# Patient Record
Sex: MALE | Race: WHITE | NOT HISPANIC OR LATINO | URBAN - METROPOLITAN AREA
[De-identification: names, ages, dates, MRNs, and addresses within clinical notes are randomized per-mention and may not be internally consistent; named-entity substitution may affect disease eponyms.]

---

## 2024-08-12 ENCOUNTER — OFFICE VISIT (OUTPATIENT)
Dept: URBAN - METROPOLITAN AREA CLINIC 50 | Facility: CLINIC | Age: 29
End: 2024-08-12
Payer: SELF-PAY

## 2024-08-12 ENCOUNTER — LAB OUTSIDE AN ENCOUNTER (OUTPATIENT)
Dept: URBAN - METROPOLITAN AREA CLINIC 50 | Facility: CLINIC | Age: 29
End: 2024-08-12

## 2024-08-12 ENCOUNTER — OFFICE VISIT (OUTPATIENT)
Dept: URBAN - METROPOLITAN AREA CLINIC 50 | Facility: CLINIC | Age: 29
End: 2024-08-12

## 2024-08-12 ENCOUNTER — DASHBOARD ENCOUNTERS (OUTPATIENT)
Age: 29
End: 2024-08-12

## 2024-08-12 ENCOUNTER — TELEPHONE ENCOUNTER (OUTPATIENT)
Dept: URBAN - METROPOLITAN AREA CLINIC 50 | Facility: CLINIC | Age: 29
End: 2024-08-12

## 2024-08-12 VITALS
BODY MASS INDEX: 23.25 KG/M2 | RESPIRATION RATE: 18 BRPM | HEART RATE: 66 BPM | TEMPERATURE: 98.1 F | HEIGHT: 70 IN | SYSTOLIC BLOOD PRESSURE: 110 MMHG | WEIGHT: 162.4 LBS | DIASTOLIC BLOOD PRESSURE: 80 MMHG

## 2024-08-12 DIAGNOSIS — K83.8 DILATED BILE DUCT: ICD-10-CM

## 2024-08-12 DIAGNOSIS — R10.13 ABDOMINAL DISCOMFORT, EPIGASTRIC: ICD-10-CM

## 2024-08-12 DIAGNOSIS — R74.8 ABNORMAL LIVER ENZYMES: ICD-10-CM

## 2024-08-12 DIAGNOSIS — R10.31 ABDOMINAL CRAMPING IN RIGHT LOWER QUADRANT: ICD-10-CM

## 2024-08-12 PROBLEM — 123608004: Status: ACTIVE | Noted: 2024-08-12

## 2024-08-12 PROBLEM — 162031009: Status: ACTIVE | Noted: 2024-08-12

## 2024-08-12 PROBLEM — 83132003: Status: ACTIVE | Noted: 2024-08-12

## 2024-08-12 PROBLEM — 274527008: Status: ACTIVE | Noted: 2024-08-12

## 2024-08-12 PROCEDURE — 99204 OFFICE O/P NEW MOD 45 MIN: CPT | Performed by: INTERNAL MEDICINE

## 2024-08-12 RX ORDER — HYOSCYAMINE SULFATE 0.12 MG/1
1 TABLET AS NEEDED TABLET ORAL
Qty: 30 TABLET | Refills: 1 | OUTPATIENT
Start: 2024-08-12 | End: 2024-08-22

## 2024-08-12 RX ORDER — ESOMEPRAZOLE MAGNESIUM 40 MG/1
1 CAPSULE CAPSULE, DELAYED RELEASE PELLETS ORAL ONCE A DAY
Qty: 90 CAPSULE | Refills: 1 | OUTPATIENT
Start: 2024-08-12

## 2024-08-12 NOTE — PHYSICAL EXAM GASTROINTESTINAL
Abdomen , soft, epigastric to LUQ tender, nondistended , no guarding or rigidity , no masses palpable , normal bowel sounds , Liver and Spleen,  no hepatosplenomegaly , liver nontender

## 2024-08-12 NOTE — HPI-TODAY'S VISIT:
Pt 30 y/o M here for f/u after recent ER visit 8/6/24 at [facility], then subsequently admitted to [facility] 8/9/24-8/10/24 First presented to [facility] w LUQ pains x 3 days, was worried had pancreatitis at that time CBC, CMP, lipase remarkable for H/H 12.3/37.9, normocytic Alk phos 143, AST 69.  CT revealed no acute intra abdominal abnormality, hepatic steatosis. Recommended protonix, levsin and f/u as outpatient Was subsequently seen 8/10/24 at [facility] for abd pains Had been admitted w elevated AST/ALT./ALP w mild CBD dilation on US SUbsequently led to MRCP with extra-hepataic biliary dilation without stones Patient eventually left AMA with ongoing workup pending MRCP 8/9/24 revealing mild extrahepatic biliary duct dilation of unknown etiology without choledocholithiasis. Distended gallbladder without cholelithiaisis. Presents w partner, Lawanda States last Wednesday, had really bad upper abdominal pains Initially presented 2 weeks prior with bad abd pains, but resolved after 3 days  Felt like a recurrence, but didn't resolve which is when was being seen at [facility] Noticed little improvement w increasing abd pains after discharge, so was re-evaluated at [facility] where rest of workup was completed Had considered surgery vs ERCP for management However was feeling really bad and left AMA as felt like hospital was not providing adequate pain control and poor interaction w nursing staff States feels like stomach is now improving though Eating bland foods, may seem to help However certain foods seem to worsen pains, anxiety worsens pain Feels LUQ abd pains when this occurs to epigastric pains Some nausea w pain, no vomiting BMs slightly constipated, but now going daily Admits frequent indigestion/heartburn, on daily omeprazole which seems to control syx No dysphagia No labs since discharge Admits hx recreational THC, ketamine use; denies current use Stopping alcohol use as noticing worsening symptoms w this

## 2024-08-14 ENCOUNTER — WEB ENCOUNTER (OUTPATIENT)
Dept: URBAN - METROPOLITAN AREA CLINIC 50 | Facility: CLINIC | Age: 29
End: 2024-08-14

## 2024-08-14 LAB
A/G RATIO: 1.6
ALBUMIN: 4.2
ALKALINE PHOSPHATASE: 175
ALT (SGPT): 257
AST (SGOT): 87
BILIRUBIN, TOTAL: 0.3
BUN/CREATININE RATIO: (no result)
BUN: 7
CALCIUM: 9.1
CARBON DIOXIDE, TOTAL: 26
CHLORIDE: 102
CREATININE: 0.83
EGFR: 122
GLOBULIN, TOTAL: 2.6
GLUCOSE: 135
HEMATOCRIT: 41.7
HEMOGLOBIN: 13.5
LIPASE: 8
MCH: 30.4
MCHC: 32.4
MCV: 93.9
MPV: 9.3
PLATELET COUNT: 470
POTASSIUM: 4.8
PROTEIN, TOTAL: 6.8
RDW: 11.8
RED BLOOD CELL COUNT: 4.44
SODIUM: 139
WHITE BLOOD CELL COUNT: 7.9

## 2024-08-26 ENCOUNTER — WEB ENCOUNTER (OUTPATIENT)
Dept: URBAN - METROPOLITAN AREA CLINIC 50 | Facility: CLINIC | Age: 29
End: 2024-08-26

## 2024-09-03 ENCOUNTER — OFFICE VISIT (OUTPATIENT)
Dept: URBAN - METROPOLITAN AREA MEDICAL CENTER 28 | Facility: MEDICAL CENTER | Age: 29
End: 2024-09-03

## 2024-09-03 RX ORDER — ESOMEPRAZOLE MAGNESIUM 40 MG/1
1 CAPSULE CAPSULE, DELAYED RELEASE PELLETS ORAL ONCE A DAY
Qty: 90 CAPSULE | Refills: 1 | Status: ACTIVE | COMMUNITY
Start: 2024-08-12